# Patient Record
Sex: MALE | Race: WHITE | HISPANIC OR LATINO | Employment: FULL TIME | URBAN - METROPOLITAN AREA
[De-identification: names, ages, dates, MRNs, and addresses within clinical notes are randomized per-mention and may not be internally consistent; named-entity substitution may affect disease eponyms.]

---

## 2020-01-20 ENCOUNTER — HOSPITAL ENCOUNTER (INPATIENT)
Facility: HOSPITAL | Age: 65
LOS: 3 days | Discharge: HOME | DRG: 392 | End: 2020-01-23
Attending: EMERGENCY MEDICINE | Admitting: HOSPITALIST
Payer: COMMERCIAL

## 2020-01-20 DIAGNOSIS — R14.0 ABDOMINAL DISTENTION: ICD-10-CM

## 2020-01-20 DIAGNOSIS — K56.7 ILEUS: ICD-10-CM

## 2020-01-20 DIAGNOSIS — R10.9 ABDOMINAL PAIN: ICD-10-CM

## 2020-01-20 DIAGNOSIS — Z01.89 ENCOUNTER FOR IMAGING STUDY TO CONFIRM NASOGASTRIC (NG) TUBE PLACEMENT: ICD-10-CM

## 2020-01-20 DIAGNOSIS — K56.600 PARTIAL SMALL BOWEL OBSTRUCTION: Primary | ICD-10-CM

## 2020-01-20 DIAGNOSIS — K52.9 ENTERITIS: ICD-10-CM

## 2020-01-20 PROBLEM — E78.5 HYPERLIPIDEMIA: Status: ACTIVE | Noted: 2020-01-20

## 2020-01-20 PROBLEM — D72.829 LEUKOCYTOSIS: Status: ACTIVE | Noted: 2020-01-20

## 2020-01-20 PROBLEM — I10 HYPERTENSION: Status: ACTIVE | Noted: 2020-01-20

## 2020-01-20 LAB
ABO + RH BLD: NORMAL
ALBUMIN SERPL BCP-MCNC: 4.4 G/DL (ref 3.5–5.2)
ALP SERPL-CCNC: 88 U/L (ref 55–135)
ALT SERPL W/O P-5'-P-CCNC: 27 U/L (ref 10–44)
ANION GAP SERPL CALC-SCNC: 11 MMOL/L (ref 8–16)
APTT BLDCRRT: 26.4 SEC (ref 21–32)
AST SERPL-CCNC: 26 U/L (ref 10–40)
BASOPHILS # BLD AUTO: 0.07 K/UL (ref 0–0.2)
BASOPHILS NFR BLD: 0.4 % (ref 0–1.9)
BILIRUB SERPL-MCNC: 0.4 MG/DL (ref 0.1–1)
BLD GP AB SCN CELLS X3 SERPL QL: NORMAL
BUN SERPL-MCNC: 19 MG/DL (ref 8–23)
CALCIUM SERPL-MCNC: 9.6 MG/DL (ref 8.7–10.5)
CHLORIDE SERPL-SCNC: 103 MMOL/L (ref 95–110)
CO2 SERPL-SCNC: 26 MMOL/L (ref 23–29)
CREAT SERPL-MCNC: 1.2 MG/DL (ref 0.5–1.4)
DIFFERENTIAL METHOD: ABNORMAL
EOSINOPHIL # BLD AUTO: 0.1 K/UL (ref 0–0.5)
EOSINOPHIL NFR BLD: 0.3 % (ref 0–8)
ERYTHROCYTE [DISTWIDTH] IN BLOOD BY AUTOMATED COUNT: 12.7 % (ref 11.5–14.5)
EST. GFR  (AFRICAN AMERICAN): >60 ML/MIN/1.73 M^2
EST. GFR  (NON AFRICAN AMERICAN): >60 ML/MIN/1.73 M^2
GLUCOSE SERPL-MCNC: 213 MG/DL (ref 70–110)
HCT VFR BLD AUTO: 46.5 % (ref 40–54)
HGB BLD-MCNC: 16 G/DL (ref 14–18)
IMM GRANULOCYTES # BLD AUTO: 0.12 K/UL (ref 0–0.04)
IMM GRANULOCYTES NFR BLD AUTO: 0.6 % (ref 0–0.5)
INR PPP: 1 (ref 0.8–1.2)
LACTATE SERPL-SCNC: 1.7 MMOL/L (ref 0.5–2.2)
LIPASE SERPL-CCNC: 33 U/L (ref 4–60)
LYMPHOCYTES # BLD AUTO: 1.1 K/UL (ref 1–4.8)
LYMPHOCYTES NFR BLD: 5.8 % (ref 18–48)
MCH RBC QN AUTO: 32.5 PG (ref 27–31)
MCHC RBC AUTO-ENTMCNC: 34.4 G/DL (ref 32–36)
MCV RBC AUTO: 95 FL (ref 82–98)
MONOCYTES # BLD AUTO: 1.5 K/UL (ref 0.3–1)
MONOCYTES NFR BLD: 7.5 % (ref 4–15)
NEUTROPHILS # BLD AUTO: 16.7 K/UL (ref 1.8–7.7)
NEUTROPHILS NFR BLD: 85.4 % (ref 38–73)
NRBC BLD-RTO: 0 /100 WBC
PLATELET # BLD AUTO: 287 K/UL (ref 150–350)
PMV BLD AUTO: 9.1 FL (ref 9.2–12.9)
POCT GLUCOSE: 212 MG/DL (ref 70–110)
POCT GLUCOSE: 246 MG/DL (ref 70–110)
POTASSIUM SERPL-SCNC: 4.1 MMOL/L (ref 3.5–5.1)
PROT SERPL-MCNC: 7.9 G/DL (ref 6–8.4)
PROTHROMBIN TIME: 10.7 SEC (ref 9–12.5)
RBC # BLD AUTO: 4.92 M/UL (ref 4.6–6.2)
SODIUM SERPL-SCNC: 140 MMOL/L (ref 136–145)
TROPONIN I SERPL DL<=0.01 NG/ML-MCNC: <0.006 NG/ML (ref 0–0.03)
WBC # BLD AUTO: 19.56 K/UL (ref 3.9–12.7)

## 2020-01-20 PROCEDURE — 99285 EMERGENCY DEPT VISIT HI MDM: CPT | Mod: 25

## 2020-01-20 PROCEDURE — C9399 UNCLASSIFIED DRUGS OR BIOLOG: HCPCS | Performed by: HOSPITALIST

## 2020-01-20 PROCEDURE — C9113 INJ PANTOPRAZOLE SODIUM, VIA: HCPCS | Performed by: HOSPITALIST

## 2020-01-20 PROCEDURE — 84484 ASSAY OF TROPONIN QUANT: CPT

## 2020-01-20 PROCEDURE — 83605 ASSAY OF LACTIC ACID: CPT

## 2020-01-20 PROCEDURE — 80053 COMPREHEN METABOLIC PANEL: CPT

## 2020-01-20 PROCEDURE — 87040 BLOOD CULTURE FOR BACTERIA: CPT

## 2020-01-20 PROCEDURE — 85610 PROTHROMBIN TIME: CPT

## 2020-01-20 PROCEDURE — 93010 EKG 12-LEAD: ICD-10-PCS | Mod: ,,, | Performed by: INTERNAL MEDICINE

## 2020-01-20 PROCEDURE — 85025 COMPLETE CBC W/AUTO DIFF WBC: CPT

## 2020-01-20 PROCEDURE — 63600175 PHARM REV CODE 636 W HCPCS: Performed by: EMERGENCY MEDICINE

## 2020-01-20 PROCEDURE — 93010 ELECTROCARDIOGRAM REPORT: CPT | Mod: ,,, | Performed by: INTERNAL MEDICINE

## 2020-01-20 PROCEDURE — 96374 THER/PROPH/DIAG INJ IV PUSH: CPT

## 2020-01-20 PROCEDURE — 86850 RBC ANTIBODY SCREEN: CPT

## 2020-01-20 PROCEDURE — 85730 THROMBOPLASTIN TIME PARTIAL: CPT

## 2020-01-20 PROCEDURE — 11000001 HC ACUTE MED/SURG PRIVATE ROOM

## 2020-01-20 PROCEDURE — 83690 ASSAY OF LIPASE: CPT

## 2020-01-20 PROCEDURE — 63600175 PHARM REV CODE 636 W HCPCS: Performed by: HOSPITALIST

## 2020-01-20 PROCEDURE — 93005 ELECTROCARDIOGRAM TRACING: CPT

## 2020-01-20 RX ORDER — GLUCAGON 1 MG
1 KIT INJECTION
Status: DISCONTINUED | OUTPATIENT
Start: 2020-01-20 | End: 2020-01-23 | Stop reason: HOSPADM

## 2020-01-20 RX ORDER — GLIPIZIDE 5 MG/1
TABLET ORAL
COMMUNITY
Start: 2020-01-15

## 2020-01-20 RX ORDER — MORPHINE SULFATE 10 MG/ML
4 INJECTION INTRAMUSCULAR; INTRAVENOUS; SUBCUTANEOUS EVERY 4 HOURS PRN
Status: DISCONTINUED | OUTPATIENT
Start: 2020-01-20 | End: 2020-01-23 | Stop reason: HOSPADM

## 2020-01-20 RX ORDER — SODIUM CHLORIDE, SODIUM LACTATE, POTASSIUM CHLORIDE, CALCIUM CHLORIDE 600; 310; 30; 20 MG/100ML; MG/100ML; MG/100ML; MG/100ML
INJECTION, SOLUTION INTRAVENOUS CONTINUOUS
Status: DISCONTINUED | OUTPATIENT
Start: 2020-01-20 | End: 2020-01-23 | Stop reason: HOSPADM

## 2020-01-20 RX ORDER — BUPROPION HYDROCHLORIDE 150 MG/1
TABLET, EXTENDED RELEASE ORAL
Status: ON HOLD | COMMUNITY
Start: 2020-01-15 | End: 2020-01-23 | Stop reason: HOSPADM

## 2020-01-20 RX ORDER — IBUPROFEN 200 MG
24 TABLET ORAL
Status: DISCONTINUED | OUTPATIENT
Start: 2020-01-20 | End: 2020-01-23 | Stop reason: HOSPADM

## 2020-01-20 RX ORDER — ATORVASTATIN CALCIUM 40 MG/1
TABLET, FILM COATED ORAL
COMMUNITY
Start: 2019-11-01

## 2020-01-20 RX ORDER — LABETALOL HYDROCHLORIDE 5 MG/ML
10 INJECTION, SOLUTION INTRAVENOUS EVERY 6 HOURS PRN
Status: DISCONTINUED | OUTPATIENT
Start: 2020-01-20 | End: 2020-01-23 | Stop reason: HOSPADM

## 2020-01-20 RX ORDER — SODIUM CHLORIDE 0.9 % (FLUSH) 0.9 %
10 SYRINGE (ML) INJECTION
Status: DISCONTINUED | OUTPATIENT
Start: 2020-01-20 | End: 2020-01-23 | Stop reason: HOSPADM

## 2020-01-20 RX ORDER — IBUPROFEN 200 MG
16 TABLET ORAL
Status: DISCONTINUED | OUTPATIENT
Start: 2020-01-20 | End: 2020-01-23 | Stop reason: HOSPADM

## 2020-01-20 RX ORDER — HYDROMORPHONE HYDROCHLORIDE 2 MG/ML
0.5 INJECTION, SOLUTION INTRAMUSCULAR; INTRAVENOUS; SUBCUTANEOUS
Status: COMPLETED | OUTPATIENT
Start: 2020-01-20 | End: 2020-01-20

## 2020-01-20 RX ORDER — OMEPRAZOLE 40 MG/1
CAPSULE, DELAYED RELEASE ORAL
COMMUNITY
Start: 2019-10-31

## 2020-01-20 RX ORDER — PANTOPRAZOLE SODIUM 40 MG/10ML
40 INJECTION, POWDER, LYOPHILIZED, FOR SOLUTION INTRAVENOUS DAILY
Status: DISCONTINUED | OUTPATIENT
Start: 2020-01-20 | End: 2020-01-23 | Stop reason: HOSPADM

## 2020-01-20 RX ORDER — ONDANSETRON 2 MG/ML
4 INJECTION INTRAMUSCULAR; INTRAVENOUS EVERY 6 HOURS PRN
Status: DISCONTINUED | OUTPATIENT
Start: 2020-01-20 | End: 2020-01-23 | Stop reason: HOSPADM

## 2020-01-20 RX ORDER — DULAGLUTIDE 1.5 MG/.5ML
INJECTION, SOLUTION SUBCUTANEOUS
COMMUNITY
Start: 2020-01-15

## 2020-01-20 RX ORDER — BUPROPION HYDROCHLORIDE 150 MG/1
150 TABLET, EXTENDED RELEASE ORAL
COMMUNITY
Start: 2019-11-01 | End: 2020-10-31

## 2020-01-20 RX ORDER — MORPHINE SULFATE 10 MG/ML
2 INJECTION INTRAMUSCULAR; INTRAVENOUS; SUBCUTANEOUS EVERY 4 HOURS PRN
Status: DISCONTINUED | OUTPATIENT
Start: 2020-01-20 | End: 2020-01-23 | Stop reason: HOSPADM

## 2020-01-20 RX ORDER — INSULIN ASPART 100 [IU]/ML
1-10 INJECTION, SOLUTION INTRAVENOUS; SUBCUTANEOUS
Status: DISCONTINUED | OUTPATIENT
Start: 2020-01-20 | End: 2020-01-23 | Stop reason: HOSPADM

## 2020-01-20 RX ADMIN — PANTOPRAZOLE SODIUM 40 MG: 40 INJECTION, POWDER, FOR SOLUTION INTRAVENOUS at 06:01

## 2020-01-20 RX ADMIN — INSULIN ASPART 2 UNITS: 100 INJECTION, SOLUTION INTRAVENOUS; SUBCUTANEOUS at 09:01

## 2020-01-20 RX ADMIN — SODIUM CHLORIDE 1000 ML: 0.9 INJECTION, SOLUTION INTRAVENOUS at 03:01

## 2020-01-20 RX ADMIN — INSULIN DETEMIR 10 UNITS: 100 INJECTION, SOLUTION SUBCUTANEOUS at 09:01

## 2020-01-20 RX ADMIN — HYDROMORPHONE HYDROCHLORIDE 0.5 MG: 2 INJECTION, SOLUTION INTRAMUSCULAR; INTRAVENOUS; SUBCUTANEOUS at 03:01

## 2020-01-20 RX ADMIN — MORPHINE SULFATE 4 MG: 10 INJECTION INTRAVENOUS at 08:01

## 2020-01-20 RX ADMIN — SODIUM CHLORIDE, SODIUM LACTATE, POTASSIUM CHLORIDE, AND CALCIUM CHLORIDE: .6; .31; .03; .02 INJECTION, SOLUTION INTRAVENOUS at 06:01

## 2020-01-20 NOTE — ASSESSMENT & PLAN NOTE
Likely leukomoid reaction secondary to above  No source of infection and no fevers  No indication for antibiotics at this time  Will continue to monitor

## 2020-01-20 NOTE — ASSESSMENT & PLAN NOTE
Unable to resume PO regimen due to NPO status  Will use PRN labetolol   Will resume PO regimen when able

## 2020-01-20 NOTE — SUBJECTIVE & OBJECTIVE
Past Medical History:   Diagnosis Date    Diabetes mellitus     Hyperlipidemia     Hypertension     SBO (small bowel obstruction)     Partial obstruction treated conservatively x 4       History reviewed. No pertinent surgical history.    Review of patient's allergies indicates:  No Known Allergies    No current facility-administered medications on file prior to encounter.      Current Outpatient Medications on File Prior to Encounter   Medication Sig    buPROPion (WELLBUTRIN SR) 150 MG TBSR 12 hr tablet Take 150 mg by mouth.    amlodipine-benazepril 10-20mg (LOTREL) 10-20 mg per capsule Take 1 capsule by mouth once daily.      aspirin 81 MG Chew Take 81 mg by mouth once daily.      atorvastatin (LIPITOR) 40 MG tablet     buPROPion (WELLBUTRIN SR) 150 MG TBSR 12 hr tablet     duloxetine (CYMBALTA) 60 MG capsule Take 60 mg by mouth once daily.      ezetimibe-simvastatin 10-40 mg (VYTORIN) 10-40 mg per tablet Take 1 tablet by mouth every evening.      glipiZIDE (GLUCOTROL) 5 MG tablet     metformin (GLUCOPHAGE) 850 MG tablet Take 850 mg by mouth 2 (two) times daily with meals.      omeprazole (PRILOSEC) 40 MG capsule     TRULICITY 1.5 mg/0.5 mL PnIj      Family History     Problem Relation (Age of Onset)    COPD Father        Tobacco Use    Smoking status: Former Smoker     Packs/day: 1.50     Years: 15.00     Pack years: 22.50    Smokeless tobacco: Never Used    Tobacco comment: Quit smoking in 1992   Substance and Sexual Activity    Alcohol use: No    Drug use: Not on file    Sexual activity: Not on file     Review of Systems   Constitutional: Negative for chills and fever.   HENT: Negative for sore throat and trouble swallowing.    Eyes: Negative for visual disturbance.   Respiratory: Negative for shortness of breath.    Cardiovascular: Negative for chest pain.   Gastrointestinal: Positive for abdominal distention, abdominal pain and constipation. Negative for blood in stool, diarrhea, nausea  and vomiting.   Endocrine: Negative for polyuria.   Genitourinary: Negative for dysuria.   Musculoskeletal: Negative for back pain.   Skin: Negative for rash.   Neurological: Negative for light-headedness.   Hematological: Does not bruise/bleed easily.   Psychiatric/Behavioral: Negative for confusion.     Objective:     Vital Signs (Most Recent):  Temp: 97.6 °F (36.4 °C) (01/20/20 1708)  Pulse: 90 (01/20/20 1708)  Resp: 18 (01/20/20 1708)  BP: (!) 179/92 (01/20/20 1708)  SpO2: 97 % (01/20/20 1708) Vital Signs (24h Range):  Temp:  [97.6 °F (36.4 °C)-98.4 °F (36.9 °C)] 97.6 °F (36.4 °C)  Pulse:  [] 90  Resp:  [18-20] 18  SpO2:  [91 %-97 %] 97 %  BP: (148-181)/() 179/92     Weight: 106.6 kg (235 lb)  Body mass index is 34.7 kg/m².    Physical Exam   Constitutional: He appears well-developed. No distress.   HENT:   Head: Normocephalic and atraumatic.   NG tube in place with small amount of blood around nares   Eyes: Conjunctivae and EOM are normal.   Neck: Normal range of motion. Neck supple.   Cardiovascular: Normal rate and regular rhythm.   Pulmonary/Chest: Effort normal and breath sounds normal.   Abdominal:   Distended, tenderness to palpation diffusely, but concentrated around the umbilicus, no rebound or guarding, hypoactive bowel sounds   Musculoskeletal: Normal range of motion. He exhibits no edema.   Neurological: He is alert.   Skin: Skin is warm and dry. Capillary refill takes less than 2 seconds. He is not diaphoretic.   Psychiatric: He has a normal mood and affect. His behavior is normal. Thought content normal.         CRANIAL NERVES     CN III, IV, VI   Extraocular motions are normal.        Significant Labs: All pertinent labs within the past 24 hours have been reviewed.    Significant Imaging: I have reviewed and interpreted all pertinent imaging results/findings within the past 24 hours.

## 2020-01-20 NOTE — ED TRIAGE NOTES
Patient reports mid-center abdominal pain x12 hours. Hx of bowel obstruction in the past. Denies N/V. Does report constipation x1 week.    Two patient identifiers have been checked and are correct.      Appearance: Pt awake, alert & oriented to person, place & time. Pt in no acute distress at present time. Pt is clean and well groomed with clothes appropriately fastened.   Skin: Skin warm, dry & intact. Color consistent with ethnicity. Mucous membranes moist. No breakdown or brusing noted.   Musculoskeletal: Patient moving all extremities well, no obvious swelling or deformities noted.   Respiratory: Respirations spontaneous, even, and non-labored. Visible chest rise noted. Airway is open and patent. No accessory muscle use noted.   Neurologic: Sensation is intact. Speech is clear and appropriate. Eyes open spontaneously, behavior appropriate to situation, follows commands, facial expression symmetrical, bilateral hand grasp equal and even, purposeful motor response noted.  Cardiac: All peripheral pulses present. No Bilateral lower extremity edema. Cap refill is <3 seconds.  Abdomen: Abdomen soft, tender to palpation.   : Pt reports no dysuria or hematuria.

## 2020-01-20 NOTE — ASSESSMENT & PLAN NOTE
Hold outpatient regimen and unclear control in the past  Will start low dose basal insulin, accuchecks Q6 hours while NPO and have SSI   Will check HgbA1C  Glucose goal of 140-180 during hospitalization

## 2020-01-20 NOTE — HPI
"64-year-old male with HTN, HLP, DM2 and with history of partial SBO x4 in the past treated conservatively who presented with abdominal pain and distension that started last night around 8:00 p.m. He has been having constipation for the past week. His last normal bowel movement was 1 week ago, but he reported passing some flatus and small, hard stool this morning.  Pain did not improve with this small passing of stool, and it only increased.  Abdominal pain is diffuse but mainly located in the mid abdomen.  No aggravating or relieving factors.  No reported nausea or vomiting.  No shortness of breath or chest pain, no fever or chills.  In the ER, workup with CT scan of the abdomen pelvis without contrast showed "The distal esophagus is somewhat patulous with layering fluid.  Gastric lumen is fluid distended with high density material at the pylorus.  The proximal and mid small bowel including the duodenum and jejunum are dilated and fluid-filled common definitive transition point is not identified, however the ileum appears collapsed.  Stool is seen throughout the colon."  WBC elevated 19.56, afebrile temperature of 97.6° F.  "

## 2020-01-20 NOTE — ED PROVIDER NOTES
Encounter Date: 1/20/2020       History     Chief Complaint   Patient presents with    Abdominal Pain     EMS reports pt c/o abdominal pain starting last night reports has not had a good BM in over one week.      64 y.o. male Past Medical History:  No date: Diabetes mellitus  No date: Hyperlipidemia  No date: Hypertension     Prior SBO, presents c/o 1 day abd cramping/distention, notes small hard bm this am . No f/c, n/v, diarrhea/dysuria. No vomiting/diarrhea.        Review of patient's allergies indicates:  No Known Allergies  Past Medical History:   Diagnosis Date    Diabetes mellitus     Hyperlipidemia     Hypertension      No past surgical history on file.  No family history on file.  Social History     Tobacco Use    Smoking status: Never Smoker   Substance Use Topics    Alcohol use: No    Drug use: Not on file     Review of Systems   Constitutional: Negative for fever.   HENT: Negative for sore throat.    Respiratory: Negative for shortness of breath.    Cardiovascular: Negative for chest pain.   Gastrointestinal: Positive for abdominal distention and abdominal pain. Negative for nausea.   Genitourinary: Negative for dysuria.   Musculoskeletal: Negative for back pain.   Skin: Negative for rash.   Neurological: Negative for weakness.   Hematological: Does not bruise/bleed easily.   All other systems reviewed and are negative.      Physical Exam     Initial Vitals [01/20/20 1431]   BP Pulse Resp Temp SpO2   (!) 155/85 100 18 98.4 °F (36.9 °C) 97 %      MAP       --         Physical Exam    Nursing note and vitals reviewed.  Constitutional: He appears well-developed and well-nourished.   HENT:   Head: Normocephalic and atraumatic.   Eyes: EOM are normal. Pupils are equal, round, and reactive to light.   Cardiovascular: Normal rate and regular rhythm.   Pulmonary/Chest: Effort normal.   Abdominal: He exhibits distension.   Musculoskeletal: He exhibits no edema or tenderness.   Neurological: He is alert and  oriented to person, place, and time. No cranial nerve deficit.   Skin: Skin is warm and dry.   Psychiatric: He has a normal mood and affect.     non focal mild abd ttp throughout  Rectal exam no fecal impaction    ED Course   Procedures  Labs Reviewed - No data to display  EKG Readings: (Independently Interpreted)   Hr 91, L axis, sinus, no mark/twi, non acute, no stemi.        Imaging Results    None                             screening labs, ct abd/pelvis.        Labs Reviewed   CBC W/ AUTO DIFFERENTIAL - Abnormal; Notable for the following components:       Result Value    WBC 19.56 (*)     Mean Corpuscular Hemoglobin 32.5 (*)     MPV 9.1 (*)     Immature Granulocytes 0.6 (*)     Gran # (ANC) 16.7 (*)     Immature Grans (Abs) 0.12 (*)     Mono # 1.5 (*)     Gran% 85.4 (*)     Lymph% 5.8 (*)     All other components within normal limits   COMPREHENSIVE METABOLIC PANEL - Abnormal; Notable for the following components:    Glucose 213 (*)     All other components within normal limits   CULTURE, BLOOD   CULTURE, BLOOD   LIPASE   TROPONIN I   PROTIME-INR   APTT   LACTIC ACID, PLASMA   TYPE & SCREEN       CT Abdomen Pelvis  Without Contrast   Final Result      Limited evaluation without IV or PO contrast.  Gastric and small bowel fluid-filled distention as described above, no definitive transition point noted.  Findings may reflect ileus versus partial obstruction.         Electronically signed by: Efraín Dinero   Date:    01/20/2020   Time:    15:42        Wbc count elevation noted. Pt is not septic and does not meet sirs criteria at this time.    I have ordered NG tube, will admit pt to medicine team and consult surgery.    I have spoken with Dr. Romeo from gen surg who is aware of the patient and recommends soap suds enema.    Clinical Impression:       ICD-10-CM ICD-9-CM   1. Partial small bowel obstruction K56.600 560.9   2. Abdominal pain R10.9 789.00   3. Ileus K56.7 560.1                              Mihaela Wall MD  01/20/20 2908

## 2020-01-20 NOTE — ASSESSMENT & PLAN NOTE
Patient with 4 episodes of partial SBO in that past, last hospitalization about 2 years ago treated in [place]  CT scan with partial SBO vs ileus  S/p NG for decompression placed in the ER  Will treat with bowel rest, IV fluids, supportive care and pain control  Will consult General surgery for their input, but hopefully will be able to manage conservatively like in the past

## 2020-01-21 LAB
ALBUMIN SERPL BCP-MCNC: 4.3 G/DL (ref 3.5–5.2)
ALP SERPL-CCNC: 99 U/L (ref 55–135)
ALT SERPL W/O P-5'-P-CCNC: 30 U/L (ref 10–44)
ANION GAP SERPL CALC-SCNC: 13 MMOL/L (ref 8–16)
AST SERPL-CCNC: 20 U/L (ref 10–40)
BASOPHILS # BLD AUTO: 0.05 K/UL (ref 0–0.2)
BASOPHILS NFR BLD: 0.4 % (ref 0–1.9)
BILIRUB SERPL-MCNC: 0.6 MG/DL (ref 0.1–1)
BUN SERPL-MCNC: 22 MG/DL (ref 8–23)
CALCIUM SERPL-MCNC: 9.7 MG/DL (ref 8.7–10.5)
CHLORIDE SERPL-SCNC: 102 MMOL/L (ref 95–110)
CO2 SERPL-SCNC: 25 MMOL/L (ref 23–29)
CREAT SERPL-MCNC: 0.9 MG/DL (ref 0.5–1.4)
DIFFERENTIAL METHOD: ABNORMAL
EOSINOPHIL # BLD AUTO: 0 K/UL (ref 0–0.5)
EOSINOPHIL NFR BLD: 0.1 % (ref 0–8)
ERYTHROCYTE [DISTWIDTH] IN BLOOD BY AUTOMATED COUNT: 12.5 % (ref 11.5–14.5)
EST. GFR  (AFRICAN AMERICAN): >60 ML/MIN/1.73 M^2
EST. GFR  (NON AFRICAN AMERICAN): >60 ML/MIN/1.73 M^2
ESTIMATED AVG GLUCOSE: 117 MG/DL (ref 68–131)
GLUCOSE SERPL-MCNC: 212 MG/DL (ref 70–110)
HBA1C MFR BLD HPLC: 5.7 % (ref 4–5.6)
HCT VFR BLD AUTO: 51 % (ref 40–54)
HGB BLD-MCNC: 17 G/DL (ref 14–18)
IMM GRANULOCYTES # BLD AUTO: 0.04 K/UL (ref 0–0.04)
IMM GRANULOCYTES NFR BLD AUTO: 0.3 % (ref 0–0.5)
LYMPHOCYTES # BLD AUTO: 0.7 K/UL (ref 1–4.8)
LYMPHOCYTES NFR BLD: 5.2 % (ref 18–48)
MAGNESIUM SERPL-MCNC: 1.9 MG/DL (ref 1.6–2.6)
MCH RBC QN AUTO: 31.1 PG (ref 27–31)
MCHC RBC AUTO-ENTMCNC: 33.3 G/DL (ref 32–36)
MCV RBC AUTO: 93 FL (ref 82–98)
MONOCYTES # BLD AUTO: 1.6 K/UL (ref 0.3–1)
MONOCYTES NFR BLD: 11.8 % (ref 4–15)
NEUTROPHILS # BLD AUTO: 11.1 K/UL (ref 1.8–7.7)
NEUTROPHILS NFR BLD: 82.2 % (ref 38–73)
NRBC BLD-RTO: 0 /100 WBC
PHOSPHATE SERPL-MCNC: 4.2 MG/DL (ref 2.7–4.5)
PLATELET # BLD AUTO: 303 K/UL (ref 150–350)
PMV BLD AUTO: 9.7 FL (ref 9.2–12.9)
POCT GLUCOSE: 145 MG/DL (ref 70–110)
POCT GLUCOSE: 165 MG/DL (ref 70–110)
POCT GLUCOSE: 171 MG/DL (ref 70–110)
POCT GLUCOSE: 175 MG/DL (ref 70–110)
POTASSIUM SERPL-SCNC: 4.1 MMOL/L (ref 3.5–5.1)
PROT SERPL-MCNC: 7.9 G/DL (ref 6–8.4)
RBC # BLD AUTO: 5.47 M/UL (ref 4.6–6.2)
SODIUM SERPL-SCNC: 140 MMOL/L (ref 136–145)
WBC # BLD AUTO: 13.49 K/UL (ref 3.9–12.7)

## 2020-01-21 PROCEDURE — 80053 COMPREHEN METABOLIC PANEL: CPT

## 2020-01-21 PROCEDURE — 36415 COLL VENOUS BLD VENIPUNCTURE: CPT

## 2020-01-21 PROCEDURE — 85025 COMPLETE CBC W/AUTO DIFF WBC: CPT

## 2020-01-21 PROCEDURE — 83735 ASSAY OF MAGNESIUM: CPT

## 2020-01-21 PROCEDURE — 63600175 PHARM REV CODE 636 W HCPCS: Performed by: HOSPITALIST

## 2020-01-21 PROCEDURE — 11000001 HC ACUTE MED/SURG PRIVATE ROOM

## 2020-01-21 PROCEDURE — 84100 ASSAY OF PHOSPHORUS: CPT

## 2020-01-21 PROCEDURE — 25500020 PHARM REV CODE 255: Performed by: INTERNAL MEDICINE

## 2020-01-21 PROCEDURE — 83036 HEMOGLOBIN GLYCOSYLATED A1C: CPT

## 2020-01-21 PROCEDURE — C9113 INJ PANTOPRAZOLE SODIUM, VIA: HCPCS | Performed by: HOSPITALIST

## 2020-01-21 RX ADMIN — MORPHINE SULFATE 4 MG: 10 INJECTION INTRAVENOUS at 12:01

## 2020-01-21 RX ADMIN — IOHEXOL 100 ML: 350 INJECTION, SOLUTION INTRAVENOUS at 02:01

## 2020-01-21 RX ADMIN — SODIUM CHLORIDE, SODIUM LACTATE, POTASSIUM CHLORIDE, AND CALCIUM CHLORIDE: .6; .31; .03; .02 INJECTION, SOLUTION INTRAVENOUS at 06:01

## 2020-01-21 RX ADMIN — MORPHINE SULFATE 4 MG: 10 INJECTION INTRAVENOUS at 08:01

## 2020-01-21 RX ADMIN — PANTOPRAZOLE SODIUM 40 MG: 40 INJECTION, POWDER, FOR SOLUTION INTRAVENOUS at 08:01

## 2020-01-21 RX ADMIN — SODIUM CHLORIDE, SODIUM LACTATE, POTASSIUM CHLORIDE, AND CALCIUM CHLORIDE: .6; .31; .03; .02 INJECTION, SOLUTION INTRAVENOUS at 04:01

## 2020-01-21 RX ADMIN — MORPHINE SULFATE 4 MG: 10 INJECTION INTRAVENOUS at 04:01

## 2020-01-21 RX ADMIN — INSULIN DETEMIR 10 UNITS: 100 INJECTION, SOLUTION SUBCUTANEOUS at 09:01

## 2020-01-21 RX ADMIN — IOHEXOL 15 ML: 300 INJECTION, SOLUTION INTRAVENOUS at 02:01

## 2020-01-21 NOTE — NURSING
Soap suds enema was effective.  Patient had a large soft/liquid brown stool.  Patient reports that his abdominal pain has decreased.      NGT advanced 22 cm as per X-ray recommendation.  Awaiting KUB results before hooking patient up to wall suction.

## 2020-01-21 NOTE — PLAN OF CARE
Problem: Fall Injury Risk  Goal: Absence of Fall and Fall-Related Injury  Outcome: Ongoing, Progressing     Problem: Adult Inpatient Plan of Care  Goal: Plan of Care Review  Outcome: Ongoing, Progressing     Problem: Adult Inpatient Plan of Care  Goal: Patient-Specific Goal (Individualization)  Outcome: Ongoing, Progressing     Problem: Adult Inpatient Plan of Care  Goal: Absence of Hospital-Acquired Illness or Injury  Outcome: Ongoing, Progressing     Problem: Adult Inpatient Plan of Care  Goal: Optimal Comfort and Wellbeing  Outcome: Ongoing, Progressing     Problem: Adult Inpatient Plan of Care  Goal: Readiness for Transition of Care  Outcome: Ongoing, Progressing

## 2020-01-21 NOTE — H&P
"[facility] Hospital Medicine  History & Physical    Patient Name: Berto Hawthorne  MRN: [id number]  Admission Date: 1/20/2020  Attending Physician: Jo Grey MD   Primary Care Provider: Provider [employer]         Patient information was obtained from patient and ER records.     Subjective:     Principal Problem:Partial small bowel obstruction    Chief Complaint:   Chief Complaint   Patient presents with    Abdominal Pain     EMS reports pt c/o abdominal pain starting last night reports has not had a good BM in over one week.         HPI: 64-year-old male with HTN, HLP, DM2 and with history of partial SBO x4 in the past treated conservatively who presented with abdominal pain and distension that started last night around 8:00 p.m. He has been having constipation for the past week. His last normal bowel movement was 1 week ago, but he reported passing some flatus and small, hard stool this morning.  Pain did not improve with this small passing of stool, and it only increased.  Abdominal pain is diffuse but mainly located in the mid abdomen.  No aggravating or relieving factors.  No reported nausea or vomiting.  No shortness of breath or chest pain, no fever or chills.  In the ER, workup with CT scan of the abdomen pelvis without contrast showed "The distal esophagus is somewhat patulous with layering fluid.  Gastric lumen is fluid distended with high density material at the pylorus.  The proximal and mid small bowel including the duodenum and jejunum are dilated and fluid-filled common definitive transition point is not identified, however the ileum appears collapsed.  Stool is seen throughout the colon."  WBC elevated 19.56, afebrile temperature of 97.6° F.    Past Medical History:   Diagnosis Date    Diabetes mellitus     Hyperlipidemia     Hypertension     SBO (small bowel obstruction)     Partial obstruction treated conservatively x 4       History reviewed. No pertinent surgical " history.    Review of patient's allergies indicates:  No Known Allergies    No current facility-administered medications on file prior to encounter.      Current Outpatient Medications on File Prior to Encounter   Medication Sig    buPROPion (WELLBUTRIN SR) 150 MG TBSR 12 hr tablet Take 150 mg by mouth.    amlodipine-benazepril 10-20mg (LOTREL) 10-20 mg per capsule Take 1 capsule by mouth once daily.      aspirin 81 MG Chew Take 81 mg by mouth once daily.      atorvastatin (LIPITOR) 40 MG tablet     buPROPion (WELLBUTRIN SR) 150 MG TBSR 12 hr tablet     duloxetine (CYMBALTA) 60 MG capsule Take 60 mg by mouth once daily.      ezetimibe-simvastatin 10-40 mg (VYTORIN) 10-40 mg per tablet Take 1 tablet by mouth every evening.      glipiZIDE (GLUCOTROL) 5 MG tablet     metformin (GLUCOPHAGE) 850 MG tablet Take 850 mg by mouth 2 (two) times daily with meals.      omeprazole (PRILOSEC) 40 MG capsule     TRULICITY 1.5 mg/0.5 mL PnIj      Family History     Problem Relation (Age of Onset)    COPD Father        Tobacco Use    Smoking status: Former Smoker     Packs/day: 1.50     Years: 15.00     Pack years: 22.50    Smokeless tobacco: Never Used    Tobacco comment: Quit smoking in 1992   Substance and Sexual Activity    Alcohol use: No    Drug use: Not on file    Sexual activity: Not on file     Review of Systems   Constitutional: Negative for chills and fever.   HENT: Negative for sore throat and trouble swallowing.    Eyes: Negative for visual disturbance.   Respiratory: Negative for shortness of breath.    Cardiovascular: Negative for chest pain.   Gastrointestinal: Positive for abdominal distention, abdominal pain and constipation. Negative for blood in stool, diarrhea, nausea and vomiting.   Endocrine: Negative for polyuria.   Genitourinary: Negative for dysuria.   Musculoskeletal: Negative for back pain.   Skin: Negative for rash.   Neurological: Negative for light-headedness.   Hematological: Does not  bruise/bleed easily.   Psychiatric/Behavioral: Negative for confusion.     Objective:     Vital Signs (Most Recent):  Temp: 97.6 °F (36.4 °C) (01/20/20 1708)  Pulse: 90 (01/20/20 1708)  Resp: 18 (01/20/20 1708)  BP: (!) 179/92 (01/20/20 1708)  SpO2: 97 % (01/20/20 1708) Vital Signs (24h Range):  Temp:  [97.6 °F (36.4 °C)-98.4 °F (36.9 °C)] 97.6 °F (36.4 °C)  Pulse:  [] 90  Resp:  [18-20] 18  SpO2:  [91 %-97 %] 97 %  BP: (148-181)/() 179/92     Weight: 106.6 kg (235 lb)  Body mass index is 34.7 kg/m².    Physical Exam   Constitutional: He appears well-developed. No distress.   HENT:   Head: Normocephalic and atraumatic.   NG tube in place with small amount of blood around nares   Eyes: Conjunctivae and EOM are normal.   Neck: Normal range of motion. Neck supple.   Cardiovascular: Normal rate and regular rhythm.   Pulmonary/Chest: Effort normal and breath sounds normal.   Abdominal:   Distended, tenderness to palpation diffusely, but concentrated around the umbilicus, no rebound or guarding, hypoactive bowel sounds   Musculoskeletal: Normal range of motion. He exhibits no edema.   Neurological: He is alert.   Skin: Skin is warm and dry. Capillary refill takes less than 2 seconds. He is not diaphoretic.   Psychiatric: He has a normal mood and affect. His behavior is normal. Thought content normal.         CRANIAL NERVES     CN III, IV, VI   Extraocular motions are normal.        Significant Labs: All pertinent labs within the past 24 hours have been reviewed.    Significant Imaging: I have reviewed and interpreted all pertinent imaging results/findings within the past 24 hours.    Assessment/Plan:     * Partial small bowel obstruction  Patient with 4 episodes of partial SBO in that past, last hospitalization about 2 years ago treated in [place]  CT scan with partial SBO vs ileus  S/p NG for decompression placed in the ER  Will treat with bowel rest, IV fluids, supportive care and pain control  Will consult  General surgery for their input, but hopefully will be able to manage conservatively like in the past    Leukocytosis  Likely leukomoid reaction secondary to above  No source of infection and no fevers  No indication for antibiotics at this time  Will continue to monitor    Hypertension  Unable to resume PO regimen due to NPO status  Will use PRN labetolol   Will resume PO regimen when able    Hyperlipidemia  Usually on atorvastatin  Hold statin for now given NPO status    Diabetes mellitus, type 2  Hold outpatient regimen and unclear control in the past  Will start low dose basal insulin, accuchecks Q6 hours while NPO and have SSI   Will check HgbA1C  Glucose goal of 140-180 during hospitalization      VTE Risk Mitigation (From admission, onward)         Ordered     Place ERVIN hose  Until discontinued      01/20/20 1804     IP VTE HIGH RISK PATIENT  Once      01/20/20 1740     Place sequential compression device  Until discontinued      01/20/20 1740                   Jo Grey MD  Department of Hospital Medicine   [facility]

## 2020-01-21 NOTE — CONSULTS
General Surgery Consult Note    Reason for consult: SBO  Consulting service: Primary    HPI  Berto Hawthorne is a 64 y.o. male  w/ PMHx of HTN, and DM2 and with history of partial SBO x4 in the past treated conservatively who presented with abdominal pain and distension that started last night around 8:00 p.m. He has been having constipation for the past week. His last normal bowel movement was 1 week ago, but he reported passing some flatus and small, hard stool this morning. Of note he reports mostly abdominal pain and denies n/v.    CT scan showing SBO vs ileus (no definitive transition point identified) and large stool burden in R colon and transverse colon.    WBC 19.5, lactic 1.7    Comprehensive ROS negative except as stated in HPI.    PMH  Past Medical History:   Diagnosis Date    Diabetes mellitus     Hyperlipidemia     Hypertension     SBO (small bowel obstruction)     Partial obstruction treated conservatively x 4       PSH  No previous abdominal surgeries    SocHx  30PY, quit almost 30 years ago/occ alcohol/denies illicits    FamHx  Family History   Problem Relation Age of Onset    COPD Father        All  Review of patient's allergies indicates:  No Known Allergies    Medications  See MAR    Physical Exam  General: AAOx3, NAD  HEENT: NC, AT, EOMI, NG tube in place  Neck: supple, no TTP  Chest: symmetric expansion, no distress  Cardiovascular: RRR, normal cap refill  Abdomen: soft, NTND  Extremities: moves all, no edema    Assessment: 64 y.o. male here w/ concern for SBO vs ileus. From the history and imaging, I believe that this is mostly severe constipation rather than SBO. Agree w/ NG tube given significant dilation of the stomach. Received enema in the ED w/ large BMs and improvement of his pain.    Plan:  -Cont NG tube to LIWS  -NPO  -Might require repeat enema if no bowel function in a few days  -Will cont to follow    Everett Romeo, HO-III  Surgery

## 2020-01-21 NOTE — PLAN OF CARE
Problem: Fall Injury Risk  Goal: Absence of Fall and Fall-Related Injury  1/21/2020 0322 by Clara Savage RN  Outcome: Ongoing, Progressing  1/21/2020 0317 by Clara Savage RN  Outcome: Ongoing, Progressing     Problem: Adult Inpatient Plan of Care  Goal: Plan of Care Review  1/21/2020 0322 by Clara Savage RN  Outcome: Ongoing, Progressing  1/21/2020 0317 by Clara Savage RN  Outcome: Ongoing, Progressing     Problem: Adult Inpatient Plan of Care  Goal: Patient-Specific Goal (Individualization)  1/21/2020 0322 by Clara Savage RN  Outcome: Ongoing, Progressing  1/21/2020 0317 by Clara Savage RN  Outcome: Ongoing, Progressing     Problem: Adult Inpatient Plan of Care  Goal: Absence of Hospital-Acquired Illness or Injury  1/21/2020 0322 by Clara Savage RN  Outcome: Ongoing, Progressing  1/21/2020 0317 by Clara Savage RN  Outcome: Ongoing, Progressing     Problem: Adult Inpatient Plan of Care  Goal: Optimal Comfort and Wellbeing  1/21/2020 0322 by Clara Savage RN  Outcome: Ongoing, Progressing  1/21/2020 0317 by Clara Savage RN  Outcome: Ongoing, Progressing     Problem: Adult Inpatient Plan of Care  Goal: Readiness for Transition of Care  1/21/2020 0322 by Clara Savage RN  Outcome: Ongoing, Progressing  1/21/2020 0317 by Clara Savage RN  Outcome: Ongoing, Progressing     Problem: Adult Inpatient Plan of Care  Goal: Rounds/Family Conference  1/21/2020 0322 by Clara Savage RN  Outcome: Ongoing, Progressing  1/21/2020 0317 by Clara Savage RN  Outcome: Ongoing, Progressing     Problem: Diabetes Comorbidity  Goal: Blood Glucose Level Within Desired Range  1/21/2020 0322 by Clara Savage RN  Outcome: Ongoing, Progressing  1/21/2020 0317 by Clara Savage RN  Outcome: Ongoing, Progressing     Problem: Wound  Goal: Optimal Wound Healing  Outcome: Ongoing, Progressing

## 2020-01-21 NOTE — NURSING
Patient arrived to floor via wheelchair with transporter from ED.  Patient transferred to bed independently.  AAOx4.  Patient was oriented to room, information on whiteboard, and medication regimen.  Bed low, adequate lighting provided, side rails x2 up, call bell within reach.  Admission assessment completed.  VSS.  Patient c/o abdominal pain.  Will continue to monitor and follow treatment plan.

## 2020-01-21 NOTE — PLAN OF CARE
Pt remained free from falls and injury during, PRN pain medication administered per MD orders, NPO, NG to placed in right nares connected to low continuous suction, SCD,ERVIN, TELE box 8586 maintained, IV fluids maintained, abdominal xray done, CT scan done, safety maintained will continue to monitor.

## 2020-01-21 NOTE — PLAN OF CARE
"To patient's room to discuss patient managing his care at home.      TN Role Explained.  Patient identified by using 2 identifiers:  Name and date of birth    Patient stated that his wife WILL HELP AT HOME WITH his RECOVERY.       TN reviewed with patient contents of "Push IO Packet".      TN name and contact info placed on the communication board along with disposition, approximate date of DC, next steps and emergency contact    Preferred Pharmacy:  [facility] in [place], [place]      Will self schedule PCP appointment when returns home       01/21/20 1319   Discharge Assessment   Assessment Type Discharge Planning Assessment   Confirmed/corrected address and phone number on facesheet? Yes   Assessment information obtained from? Patient   Expected Length of Stay (days) 3   Communicated expected length of stay with patient/caregiver yes   Prior to hospitilization cognitive status: Alert/Oriented   Prior to hospitalization functional status: Independent   Current cognitive status: Alert/Oriented   Current Functional Status: Independent   Lives With spouse   Able to Return to Prior Arrangements yes   Is patient able to care for self after discharge? Yes   Patient's perception of discharge disposition home or selfcare   Readmission Within the Last 30 Days no previous admission in last 30 days   Patient currently being followed by outpatient case management? No   Patient currently receives any other outside agency services? No   Equipment Currently Used at Home none   Do you have any problems affording any of your prescribed medications? No   Is the patient taking medications as prescribed? yes   Does the patient have transportation home? Yes   Transportation Anticipated family or friend will provide   Does the patient receive services at the [facility]? No   Discharge Plan A Home   Discharge Plan B   (tbd)   DME Needed Upon Discharge  none   Patient/Family in Agreement with Plan yes     "

## 2020-01-21 NOTE — PROGRESS NOTES
Patient reports feeling better after NGT.  Denies fevers.  Reports bowel movement with enema yesterday  Abdomen is distended with very mild tenderness.  There is no rebound or guarding.  WBC is down to 13.4     -63yo M with recurrent bowel obstructions reported without previous surgery.  CT was obtained without IV or PO contrast.  It is concerning that WBC was 20 on admission.      - We need to obtain CT with PO and Iv contrast  -Continue NGT, IVF  -We will follow closely    Darryl Whalen MD  [facility] Surgery Resident  C: [phone]

## 2020-01-22 LAB
ALBUMIN SERPL BCP-MCNC: 3.7 G/DL (ref 3.5–5.2)
ALP SERPL-CCNC: 78 U/L (ref 55–135)
ALT SERPL W/O P-5'-P-CCNC: 24 U/L (ref 10–44)
ANION GAP SERPL CALC-SCNC: 9 MMOL/L (ref 8–16)
AST SERPL-CCNC: 16 U/L (ref 10–40)
BASOPHILS # BLD AUTO: 0.06 K/UL (ref 0–0.2)
BASOPHILS NFR BLD: 1 % (ref 0–1.9)
BILIRUB SERPL-MCNC: 0.5 MG/DL (ref 0.1–1)
BUN SERPL-MCNC: 15 MG/DL (ref 8–23)
CALCIUM SERPL-MCNC: 8.8 MG/DL (ref 8.7–10.5)
CHLORIDE SERPL-SCNC: 104 MMOL/L (ref 95–110)
CO2 SERPL-SCNC: 27 MMOL/L (ref 23–29)
CREAT SERPL-MCNC: 0.7 MG/DL (ref 0.5–1.4)
DIFFERENTIAL METHOD: ABNORMAL
EOSINOPHIL # BLD AUTO: 0.1 K/UL (ref 0–0.5)
EOSINOPHIL NFR BLD: 2 % (ref 0–8)
ERYTHROCYTE [DISTWIDTH] IN BLOOD BY AUTOMATED COUNT: 12.3 % (ref 11.5–14.5)
EST. GFR  (AFRICAN AMERICAN): >60 ML/MIN/1.73 M^2
EST. GFR  (NON AFRICAN AMERICAN): >60 ML/MIN/1.73 M^2
GLUCOSE SERPL-MCNC: 138 MG/DL (ref 70–110)
HCT VFR BLD AUTO: 45.6 % (ref 40–54)
HGB BLD-MCNC: 15.2 G/DL (ref 14–18)
IMM GRANULOCYTES # BLD AUTO: 0.01 K/UL (ref 0–0.04)
IMM GRANULOCYTES NFR BLD AUTO: 0.2 % (ref 0–0.5)
LYMPHOCYTES # BLD AUTO: 0.7 K/UL (ref 1–4.8)
LYMPHOCYTES NFR BLD: 12.1 % (ref 18–48)
MAGNESIUM SERPL-MCNC: 2 MG/DL (ref 1.6–2.6)
MCH RBC QN AUTO: 31.9 PG (ref 27–31)
MCHC RBC AUTO-ENTMCNC: 33.3 G/DL (ref 32–36)
MCV RBC AUTO: 96 FL (ref 82–98)
MONOCYTES # BLD AUTO: 1.1 K/UL (ref 0.3–1)
MONOCYTES NFR BLD: 17.6 % (ref 4–15)
NEUTROPHILS # BLD AUTO: 4.1 K/UL (ref 1.8–7.7)
NEUTROPHILS NFR BLD: 67.1 % (ref 38–73)
NRBC BLD-RTO: 0 /100 WBC
PHOSPHATE SERPL-MCNC: 2.5 MG/DL (ref 2.7–4.5)
PLATELET # BLD AUTO: 195 K/UL (ref 150–350)
PLATELET BLD QL SMEAR: ABNORMAL
PMV BLD AUTO: 9.5 FL (ref 9.2–12.9)
POCT GLUCOSE: 118 MG/DL (ref 70–110)
POCT GLUCOSE: 127 MG/DL (ref 70–110)
POCT GLUCOSE: 142 MG/DL (ref 70–110)
POCT GLUCOSE: 192 MG/DL (ref 70–110)
POTASSIUM SERPL-SCNC: 3.7 MMOL/L (ref 3.5–5.1)
PROT SERPL-MCNC: 6.9 G/DL (ref 6–8.4)
RBC # BLD AUTO: 4.76 M/UL (ref 4.6–6.2)
SODIUM SERPL-SCNC: 140 MMOL/L (ref 136–145)
WBC # BLD AUTO: 6.13 K/UL (ref 3.9–12.7)

## 2020-01-22 PROCEDURE — 83735 ASSAY OF MAGNESIUM: CPT

## 2020-01-22 PROCEDURE — 11000001 HC ACUTE MED/SURG PRIVATE ROOM

## 2020-01-22 PROCEDURE — 84100 ASSAY OF PHOSPHORUS: CPT

## 2020-01-22 PROCEDURE — 63600175 PHARM REV CODE 636 W HCPCS: Performed by: HOSPITALIST

## 2020-01-22 PROCEDURE — C9113 INJ PANTOPRAZOLE SODIUM, VIA: HCPCS | Performed by: HOSPITALIST

## 2020-01-22 PROCEDURE — 85025 COMPLETE CBC W/AUTO DIFF WBC: CPT

## 2020-01-22 PROCEDURE — 80053 COMPREHEN METABOLIC PANEL: CPT

## 2020-01-22 PROCEDURE — 36415 COLL VENOUS BLD VENIPUNCTURE: CPT

## 2020-01-22 RX ORDER — AMLODIPINE BESYLATE 5 MG/1
5 TABLET ORAL DAILY
Status: CANCELLED | OUTPATIENT
Start: 2020-01-22

## 2020-01-22 RX ORDER — BENAZEPRIL HYDROCHLORIDE 10 MG/1
10 TABLET ORAL DAILY
Status: CANCELLED | OUTPATIENT
Start: 2020-01-22

## 2020-01-22 RX ADMIN — INSULIN ASPART 1 UNITS: 100 INJECTION, SOLUTION INTRAVENOUS; SUBCUTANEOUS at 08:01

## 2020-01-22 RX ADMIN — SODIUM CHLORIDE, SODIUM LACTATE, POTASSIUM CHLORIDE, AND CALCIUM CHLORIDE: .6; .31; .03; .02 INJECTION, SOLUTION INTRAVENOUS at 04:01

## 2020-01-22 RX ADMIN — PANTOPRAZOLE SODIUM 40 MG: 40 INJECTION, POWDER, FOR SOLUTION INTRAVENOUS at 08:01

## 2020-01-22 RX ADMIN — INSULIN DETEMIR 10 UNITS: 100 INJECTION, SOLUTION SUBCUTANEOUS at 08:01

## 2020-01-22 NOTE — PROGRESS NOTES
"[facility] Hospital Medicine  Progress Note    Patient Name: Berto Hawthorne  MRN: [id number]  Patient Class: IP- Inpatient   Admission Date: 1/20/2020  Length of Stay: 1 days  Attending Physician: Svetlana Cunningham, *  Primary Care Provider: Provider Notinsystem        Subjective:     Principal Problem:Partial small bowel obstruction        HPI:  64-year-old male with HTN, HLP, DM2 and with history of partial SBO x4 in the past treated conservatively who presented with abdominal pain and distension that started last night around 8:00 p.m. He has been having constipation for the past week. His last normal bowel movement was 1 week ago, but he reported passing some flatus and small, hard stool this morning.  Pain did not improve with this small passing of stool, and it only increased.  Abdominal pain is diffuse but mainly located in the mid abdomen.  No aggravating or relieving factors.  No reported nausea or vomiting.  No shortness of breath or chest pain, no fever or chills.  In the ER, workup with CT scan of the abdomen pelvis without contrast showed "The distal esophagus is somewhat patulous with layering fluid.  Gastric lumen is fluid distended with high density material at the pylorus.  The proximal and mid small bowel including the duodenum and jejunum are dilated and fluid-filled common definitive transition point is not identified, however the ileum appears collapsed.  Stool is seen throughout the colon."  WBC elevated 19.56, afebrile temperature of 97.6° F.    Overview/Hospital Course:  No notes on file    Interval History: Resting in bed with NGT in place  Discussed plan of care and patient in agreement    Review of Systems   Constitutional: Negative for chills and fever.   HENT: Negative for sore throat and trouble swallowing.    Eyes: Negative for visual disturbance.   Respiratory: Negative for shortness of breath.    Cardiovascular: Negative for chest pain.   Gastrointestinal: " Positive for abdominal distention and abdominal pain. Negative for blood in stool, diarrhea, nausea and vomiting.   Endocrine: Negative for polyuria.   Genitourinary: Negative for dysuria.   Musculoskeletal: Negative for back pain.   Skin: Negative for rash.   Neurological: Negative for light-headedness.   Hematological: Does not bruise/bleed easily.   Psychiatric/Behavioral: Negative for confusion.     Objective:     Vital Signs (Most Recent):  Temp: 98 °F (36.7 °C) (01/21/20 2013)  Pulse: 96 (01/21/20 2013)  Resp: 19 (01/21/20 2013)  BP: (!) 142/74 (01/21/20 2013)  SpO2: (!) 94 % (01/21/20 2013) Vital Signs (24h Range):  Temp:  [97.9 °F (36.6 °C)-98.3 °F (36.8 °C)] 98 °F (36.7 °C)  Pulse:  [] 96  Resp:  [16-19] 19  SpO2:  [94 %-95 %] 94 %  BP: (136-171)/(70-90) 142/74     Weight: 106.6 kg (235 lb)  Body mass index is 34.7 kg/m².    Intake/Output Summary (Last 24 hours) at 1/21/2020 2109  Last data filed at 1/21/2020 1814  Gross per 24 hour   Intake 0 ml   Output 2800 ml   Net -2800 ml      Physical Exam   Constitutional: He appears well-developed. No distress.   HENT:   Head: Normocephalic and atraumatic.   NG tube in place with small amount of blood around nares   Eyes: Conjunctivae and EOM are normal.   Neck: Normal range of motion. Neck supple.   Cardiovascular: Normal rate and regular rhythm.   Pulmonary/Chest: Effort normal and breath sounds normal.   Abdominal:   Distended, tenderness to palpation diffusely, but concentrated around the umbilicus, no rebound or guarding, hypoactive bowel sounds   Musculoskeletal: Normal range of motion. He exhibits no edema.   Neurological: He is alert.   Skin: Skin is warm and dry. Capillary refill takes less than 2 seconds. He is not diaphoretic.   Psychiatric: He has a normal mood and affect. His behavior is normal. Thought content normal.       Significant Labs:   CBC:   Recent Labs   Lab 01/20/20  1443 01/21/20  0401   WBC 19.56* 13.49*   HGB 16.0 17.0   HCT 46.5  51.0    303     CMP:   Recent Labs   Lab 01/20/20  1443 01/21/20  0401    140   K 4.1 4.1    102   CO2 26 25   * 212*   BUN 19 22   CREATININE 1.2 0.9   CALCIUM 9.6 9.7   PROT 7.9 7.9   ALBUMIN 4.4 4.3   BILITOT 0.4 0.6   ALKPHOS 88 99   AST 26 20   ALT 27 30   ANIONGAP 11 13   EGFRNONAA >60 >60     All pertinent labs within the past 24 hours have been reviewed.    Significant Imaging: I have reviewed all pertinent imaging results/findings within the past 24 hours.      Assessment/Plan:      * Partial small bowel obstruction  Patient with 4 episodes of partial SBO in that past, last hospitalization about 2 years ago treated in [place]  CT scan with partial SBO vs ileus  S/p NG for decompression placed in the ER  Will treat with bowel rest, IV fluids, supportive care and pain control  General Surgery consulted  Plan for CT of abd for further eval, etiology of mult SBOs unclear  Await results and further recommendations    Leukocytosis  Likely leukomoid reaction secondary to above  No source of infection and no fevers  No indication for antibiotics at this time  WBC down to 13.49  Will continue to monitor    Hypertension  Unable to resume PO regimen due to NPO status  Will use PRN labetolol   Will resume PO regimen when able    Hyperlipidemia  Usually on atorvastatin  Hold statin for now given NPO status    Diabetes mellitus, type 2  Hold outpatient regimen and unclear control in the past  Will continue low dose basal insulin, accuchecks Q6 hours while NPO and have SSI   HgbA1C 5.7  Glucose goal of 140-180 during hospitalization      VTE Risk Mitigation (From admission, onward)         Ordered     Place ERVIN hose  Until discontinued      01/20/20 1804     IP VTE HIGH RISK PATIENT  Once      01/20/20 1740     Place sequential compression device  Until discontinued      01/20/20 1740                      Svetlana Cunningham MD  Department of Hospital Medicine   [facility]

## 2020-01-22 NOTE — ASSESSMENT & PLAN NOTE
Likely leukomoid reaction secondary to above  No source of infection and no fevers  No indication for antibiotics at this time  WBC down to 13.49  Will continue to monitor

## 2020-01-22 NOTE — ASSESSMENT & PLAN NOTE
Hold outpatient regimen and unclear control in the past  Will continue low dose basal insulin, accuchecks Q6 hours while NPO and have SSI   HgbA1C 5.7  Glucose goal of 140-180 during hospitalization

## 2020-01-22 NOTE — PROGRESS NOTES
Patient reports feeling much better, passing flatus, has apetite.  He denies nausea.      Abdomen is softer, non-tender without rebound/guarding.  NGT with only 150mL output.  Afebrile with WBC 6.      A/P  CT scan from yesterday reviewed showed inflammatory process in the small bowel and mesentery. Suspect IBD.      -Remove NgT, recommend antibiotics  -consult to GI, eval for ibd and followup recommendations  -ok to advance diet as tolerated from our standpoint but wait until cleared by GI    Darryl Whalen MD  [facility] Surgery Resident  C: [phone]

## 2020-01-22 NOTE — PLAN OF CARE
Pt remained free from falls and injury during shift, medication administered per MD orders, IV fluids maintained, no complaint of nausea or abd pain, no out put from NG tube on my shift, NG tube pulled, pt tolerated clears well so we advanced diet to reg diet,  pt had a large bowel movement,Tele box 8528,SCD,ERVIN, safety maintained will continue to monitor.

## 2020-01-22 NOTE — ASSESSMENT & PLAN NOTE
Patient with 4 episodes of partial SBO in that past, last hospitalization about 2 years ago treated in [place]  CT scan with partial SBO vs ileus  S/p NG for decompression placed in the ER  Will treat with bowel rest, IV fluids, supportive care and pain control  General Surgery consulted  Plan for CT of abd for further eval, etiology of mult SBOs unclear  Await results and further recommendations

## 2020-01-22 NOTE — CONSULTS
.[facility]  Gastroenterology  Consult Note    Patient Name: Berto Hawthorne  MRN: [id number]  Admission Date: 1/20/2020  Hospital Length of Stay: 2 days  Code Status: Full Code   Primary Care Physician: Provider Notinsystem  Principal Problem:Partial small bowel obstruction    Consults  Subjective:     Chief complaint: Abdominal pain    HPI: Acute, recurrent, persistent, diffuse, non-radiating, moderate severity abd pain with some nausea.  He has had four such episodes in the past requiring admission.  No hx of radiation or abdominal surgery. No FH or personal hx of Crohns.  NGT removed yest.  He is feeling much better today and passing flatus and stool.  He wishes to eat.  No wt loss.      He is from [place] and is returning there as soon as he leaves here.     Past medical history:  Past Medical History:   Diagnosis Date    Diabetes mellitus     Hyperlipidemia     Hypertension     SBO (small bowel obstruction)     Partial obstruction treated conservatively x 4       Past surgical history:  History reviewed. No pertinent surgical history.    Social history:  Social History     Socioeconomic History    Marital status:      Spouse name: Not on file    Number of children: Not on file    Years of education: Not on file    Highest education level: Not on file   Occupational History    Not on file   Social Needs    Financial resource strain: Not on file    Food insecurity:     Worry: Not on file     Inability: Not on file    Transportation needs:     Medical: Not on file     Non-medical: Not on file   Tobacco Use    Smoking status: Former Smoker     Packs/day: 1.50     Years: 15.00     Pack years: 22.50    Smokeless tobacco: Never Used    Tobacco comment: Quit smoking in 1992   Substance and Sexual Activity    Alcohol use: No    Drug use: Not on file    Sexual activity: Not on file   Lifestyle    Physical activity:     Days per week: Not on file     Minutes per session: Not on  file    Stress: Not on file   Relationships    Social connections:     Talks on phone: Not on file     Gets together: Not on file     Attends Rastafarian service: Not on file     Active member of club or organization: Not on file     Attends meetings of clubs or organizations: Not on file     Relationship status: Not on file   Other Topics Concern    Not on file   Social History Narrative    Not on file       Medications:  Medications Prior to Admission   Medication Sig Dispense Refill Last Dose    buPROPion (WELLBUTRIN SR) 150 MG TBSR 12 hr tablet Take 150 mg by mouth.       amlodipine-benazepril 10-20mg (LOTREL) 10-20 mg per capsule Take 1 capsule by mouth once daily.         aspirin 81 MG Chew Take 81 mg by mouth once daily.         atorvastatin (LIPITOR) 40 MG tablet        buPROPion (WELLBUTRIN SR) 150 MG TBSR 12 hr tablet        duloxetine (CYMBALTA) 60 MG capsule Take 60 mg by mouth once daily.         ezetimibe-simvastatin 10-40 mg (VYTORIN) 10-40 mg per tablet Take 1 tablet by mouth every evening.         glipiZIDE (GLUCOTROL) 5 MG tablet        metformin (GLUCOPHAGE) 850 MG tablet Take 850 mg by mouth 2 (two) times daily with meals.         omeprazole (PRILOSEC) 40 MG capsule        TRULICITY 1.5 mg/0.5 mL PnIj           Allergies:  Review of patient's allergies indicates:  No Known Allergies    Review of systems:  CONSTITUTIONAL: Negative for fever, chills, weakness, weight loss, weight gain.  HEENT: Negative for blurred vision, hearing loss, nasal congestion, dry mouth, sore throat.  CARDIOVASCULAR: Negative for chest pain or palpitations.  RESPIRATORY: Negative for SOB or cough.  GASTROINTESTINAL: See HPI  GENITOURINARY: Negative for dysuria or hematuria.  MUSCULOSKELETAL: Negative for osteoarthritis or muscle pain.  SKIN: Negative for rashes/lesions.    Objective:     Vital Signs (Most Recent):  Temp: 98.4 °F (36.9 °C) (01/22/20 0732)  Pulse: 79 (01/22/20 0732)  Resp: (!) 21 (01/22/20  0732)  BP: (!) 152/88 (01/22/20 0743)  SpO2: 95 % (01/22/20 0732) Vital Signs (24h Range):  Temp:  [97.9 °F (36.6 °C)-98.5 °F (36.9 °C)] 98.4 °F (36.9 °C)  Pulse:  [] 79  Resp:  [18-21] 21  SpO2:  [93 %-95 %] 95 %  BP: (136-172)/(70-93) 152/88     Physical examination:  General: well developed, well nourished, no apparent distress  HENT: NCAT, hearing grossly intact, no palpable or visible thyroid mass  Eyes: PERRL, EOMI, anicteric sclera  Cardiovascular: Regular rate and rhythm. No murmurs appreciated.  Lungs: Non-labored respirations. Breath sounds equal.   Abdomen: soft, NTND, normoactive BS  Extremities: No C/C/E, 2+ dorsalis pedis pulses bilaterally  Skin: No jaundice, rashes or lesions  Musculoskeletal: 5/5 strength bilaterally    Labs:  WBC trending down    Imaging:  CT scan reviewed      Assessment:     Bowel obstruction/ileus - improving clinically and radiographically.  Exam benign.  WBC improving.     Plan:   - Advance diet  - Short course abx for 7 days  - Leukocytosis would suggest more of an infectious enteritis with associated ileus/obstruction.  However, he needs to follow up with his GI physician in [place] to assess for possible SB Crohns, given recurrent obstruction with lack of previous abdominal surgery/radiation.  He understands this.      Call back with questions.         Thank you for your consult.     Ang Hill MD  Gastroenterology  [facility]

## 2020-01-22 NOTE — PLAN OF CARE
Problem: Fall Injury Risk  Goal: Absence of Fall and Fall-Related Injury  Outcome: Ongoing, Progressing  Intervention: Identify and Manage Contributors to Fall Injury Risk  Flowsheets (Taken 1/22/2020 0400)  Self-Care Promotion: independence encouraged  Medication Review/Management: medications reviewed  Intervention: Promote Injury-Free Environment  Flowsheets (Taken 1/22/2020 0400)  Safety Promotion/Fall Prevention: assistive device/personal item within reach;nonskid shoes/socks when out of bed;side rails raised x 2;instructed to call staff for mobility  Environmental Safety Modification: assistive device/personal items within reach;lighting adjusted;clutter free environment maintained;room organization consistent     Problem: Adult Inpatient Plan of Care  Goal: Plan of Care Review  Outcome: Ongoing, Progressing  Goal: Optimal Comfort and Wellbeing  Outcome: Ongoing, Progressing  Intervention: Provide Person-Centered Care  Flowsheets (Taken 1/22/2020 0400)  Trust Relationship/Rapport: care explained;questions answered;questions encouraged     Problem: Diabetes Comorbidity  Goal: Blood Glucose Level Within Desired Range  Outcome: Ongoing, Progressing  Intervention: Maintain Glycemic Control  Flowsheets (Taken 1/22/2020 0400)  Glycemic Management: blood glucose monitoring

## 2020-01-22 NOTE — SUBJECTIVE & OBJECTIVE
Interval History: Resting in bed with NGT in place  Discussed plan of care and patient in agreement    Review of Systems   Constitutional: Negative for chills and fever.   HENT: Negative for sore throat and trouble swallowing.    Eyes: Negative for visual disturbance.   Respiratory: Negative for shortness of breath.    Cardiovascular: Negative for chest pain.   Gastrointestinal: Positive for abdominal distention and abdominal pain. Negative for blood in stool, diarrhea, nausea and vomiting.   Endocrine: Negative for polyuria.   Genitourinary: Negative for dysuria.   Musculoskeletal: Negative for back pain.   Skin: Negative for rash.   Neurological: Negative for light-headedness.   Hematological: Does not bruise/bleed easily.   Psychiatric/Behavioral: Negative for confusion.     Objective:     Vital Signs (Most Recent):  Temp: 98 °F (36.7 °C) (01/21/20 2013)  Pulse: 96 (01/21/20 2013)  Resp: 19 (01/21/20 2013)  BP: (!) 142/74 (01/21/20 2013)  SpO2: (!) 94 % (01/21/20 2013) Vital Signs (24h Range):  Temp:  [97.9 °F (36.6 °C)-98.3 °F (36.8 °C)] 98 °F (36.7 °C)  Pulse:  [] 96  Resp:  [16-19] 19  SpO2:  [94 %-95 %] 94 %  BP: (136-171)/(70-90) 142/74     Weight: 106.6 kg (235 lb)  Body mass index is 34.7 kg/m².    Intake/Output Summary (Last 24 hours) at 1/21/2020 2109  Last data filed at 1/21/2020 1814  Gross per 24 hour   Intake 0 ml   Output 2800 ml   Net -2800 ml      Physical Exam   Constitutional: He appears well-developed. No distress.   HENT:   Head: Normocephalic and atraumatic.   NG tube in place with small amount of blood around nares   Eyes: Conjunctivae and EOM are normal.   Neck: Normal range of motion. Neck supple.   Cardiovascular: Normal rate and regular rhythm.   Pulmonary/Chest: Effort normal and breath sounds normal.   Abdominal:   Distended, tenderness to palpation diffusely, but concentrated around the umbilicus, no rebound or guarding, hypoactive bowel sounds   Musculoskeletal: Normal range of  motion. He exhibits no edema.   Neurological: He is alert.   Skin: Skin is warm and dry. Capillary refill takes less than 2 seconds. He is not diaphoretic.   Psychiatric: He has a normal mood and affect. His behavior is normal. Thought content normal.       Significant Labs:   CBC:   Recent Labs   Lab 01/20/20  1443 01/21/20  0401   WBC 19.56* 13.49*   HGB 16.0 17.0   HCT 46.5 51.0    303     CMP:   Recent Labs   Lab 01/20/20  1443 01/21/20  0401    140   K 4.1 4.1    102   CO2 26 25   * 212*   BUN 19 22   CREATININE 1.2 0.9   CALCIUM 9.6 9.7   PROT 7.9 7.9   ALBUMIN 4.4 4.3   BILITOT 0.4 0.6   ALKPHOS 88 99   AST 26 20   ALT 27 30   ANIONGAP 11 13   EGFRNONAA >60 >60     All pertinent labs within the past 24 hours have been reviewed.    Significant Imaging: I have reviewed all pertinent imaging results/findings within the past 24 hours.

## 2020-01-23 VITALS
HEIGHT: 69 IN | TEMPERATURE: 99 F | SYSTOLIC BLOOD PRESSURE: 172 MMHG | OXYGEN SATURATION: 95 % | HEART RATE: 72 BPM | RESPIRATION RATE: 17 BRPM | BODY MASS INDEX: 34.8 KG/M2 | DIASTOLIC BLOOD PRESSURE: 81 MMHG | WEIGHT: 235 LBS

## 2020-01-23 PROBLEM — K52.9 ENTERITIS: Status: ACTIVE | Noted: 2020-01-23

## 2020-01-23 PROBLEM — D72.829 LEUKOCYTOSIS: Status: RESOLVED | Noted: 2020-01-20 | Resolved: 2020-01-23

## 2020-01-23 PROBLEM — K56.600 PARTIAL SMALL BOWEL OBSTRUCTION: Status: RESOLVED | Noted: 2020-01-20 | Resolved: 2020-01-23

## 2020-01-23 LAB
ALBUMIN SERPL BCP-MCNC: 3.5 G/DL (ref 3.5–5.2)
ALP SERPL-CCNC: 72 U/L (ref 55–135)
ALT SERPL W/O P-5'-P-CCNC: 19 U/L (ref 10–44)
ANION GAP SERPL CALC-SCNC: 7 MMOL/L (ref 8–16)
AST SERPL-CCNC: 16 U/L (ref 10–40)
BASOPHILS # BLD AUTO: 0.05 K/UL (ref 0–0.2)
BASOPHILS NFR BLD: 0.8 % (ref 0–1.9)
BILIRUB SERPL-MCNC: 0.4 MG/DL (ref 0.1–1)
BUN SERPL-MCNC: 8 MG/DL (ref 8–23)
CALCIUM SERPL-MCNC: 8.7 MG/DL (ref 8.7–10.5)
CHLORIDE SERPL-SCNC: 104 MMOL/L (ref 95–110)
CO2 SERPL-SCNC: 28 MMOL/L (ref 23–29)
CREAT SERPL-MCNC: 0.7 MG/DL (ref 0.5–1.4)
DIFFERENTIAL METHOD: ABNORMAL
EOSINOPHIL # BLD AUTO: 0.2 K/UL (ref 0–0.5)
EOSINOPHIL NFR BLD: 2.6 % (ref 0–8)
ERYTHROCYTE [DISTWIDTH] IN BLOOD BY AUTOMATED COUNT: 12 % (ref 11.5–14.5)
EST. GFR  (AFRICAN AMERICAN): >60 ML/MIN/1.73 M^2
EST. GFR  (NON AFRICAN AMERICAN): >60 ML/MIN/1.73 M^2
GLUCOSE SERPL-MCNC: 117 MG/DL (ref 70–110)
HCT VFR BLD AUTO: 39.6 % (ref 40–54)
HGB BLD-MCNC: 13.8 G/DL (ref 14–18)
IMM GRANULOCYTES # BLD AUTO: 0.01 K/UL (ref 0–0.04)
IMM GRANULOCYTES NFR BLD AUTO: 0.2 % (ref 0–0.5)
LYMPHOCYTES # BLD AUTO: 1.1 K/UL (ref 1–4.8)
LYMPHOCYTES NFR BLD: 17.9 % (ref 18–48)
MAGNESIUM SERPL-MCNC: 1.9 MG/DL (ref 1.6–2.6)
MCH RBC QN AUTO: 32.4 PG (ref 27–31)
MCHC RBC AUTO-ENTMCNC: 34.8 G/DL (ref 32–36)
MCV RBC AUTO: 93 FL (ref 82–98)
MONOCYTES # BLD AUTO: 0.9 K/UL (ref 0.3–1)
MONOCYTES NFR BLD: 15 % (ref 4–15)
NEUTROPHILS # BLD AUTO: 3.9 K/UL (ref 1.8–7.7)
NEUTROPHILS NFR BLD: 63.5 % (ref 38–73)
NRBC BLD-RTO: 0 /100 WBC
PHOSPHATE SERPL-MCNC: 2.8 MG/DL (ref 2.7–4.5)
PLATELET # BLD AUTO: 210 K/UL (ref 150–350)
PMV BLD AUTO: 9.3 FL (ref 9.2–12.9)
POCT GLUCOSE: 149 MG/DL (ref 70–110)
POTASSIUM SERPL-SCNC: 3.6 MMOL/L (ref 3.5–5.1)
PROT SERPL-MCNC: 6.6 G/DL (ref 6–8.4)
RBC # BLD AUTO: 4.26 M/UL (ref 4.6–6.2)
SODIUM SERPL-SCNC: 139 MMOL/L (ref 136–145)
WBC # BLD AUTO: 6.13 K/UL (ref 3.9–12.7)

## 2020-01-23 PROCEDURE — 36415 COLL VENOUS BLD VENIPUNCTURE: CPT

## 2020-01-23 PROCEDURE — 83735 ASSAY OF MAGNESIUM: CPT

## 2020-01-23 PROCEDURE — 80053 COMPREHEN METABOLIC PANEL: CPT

## 2020-01-23 PROCEDURE — 99239 HOSP IP/OBS DSCHRG MGMT >30: CPT | Mod: ,,, | Performed by: INTERNAL MEDICINE

## 2020-01-23 PROCEDURE — C9113 INJ PANTOPRAZOLE SODIUM, VIA: HCPCS | Performed by: HOSPITALIST

## 2020-01-23 PROCEDURE — 99239 PR HOSPITAL DISCHARGE DAY,>30 MIN: ICD-10-PCS | Mod: ,,, | Performed by: INTERNAL MEDICINE

## 2020-01-23 PROCEDURE — 63600175 PHARM REV CODE 636 W HCPCS: Performed by: HOSPITALIST

## 2020-01-23 PROCEDURE — 84100 ASSAY OF PHOSPHORUS: CPT

## 2020-01-23 PROCEDURE — 85025 COMPLETE CBC W/AUTO DIFF WBC: CPT

## 2020-01-23 RX ORDER — METRONIDAZOLE 500 MG/1
500 TABLET ORAL EVERY 12 HOURS
Qty: 14 TABLET | Refills: 0 | Status: SHIPPED | OUTPATIENT
Start: 2020-01-23 | End: 2020-01-30

## 2020-01-23 RX ORDER — CIPROFLOXACIN 500 MG/1
500 TABLET ORAL EVERY 12 HOURS
Qty: 14 TABLET | Refills: 0 | Status: SHIPPED | OUTPATIENT
Start: 2020-01-23 | End: 2020-01-30

## 2020-01-23 RX ADMIN — PANTOPRAZOLE SODIUM 40 MG: 40 INJECTION, POWDER, FOR SOLUTION INTRAVENOUS at 09:01

## 2020-01-23 NOTE — ASSESSMENT & PLAN NOTE
Patient with 4 episodes of partial SBO in that past, last hospitalization about 2 years ago treated in [place]  CT scan with partial SBO vs ileus  S/p NG for decompression placed in the ER  Will treat with bowel rest, IV fluids, supportive care and pain control  General Surgery consulted  CT of abd c/w enteritis, GI believes this is infectious in etiology  F/u with GI as outpatient, Dr. Power in [place], [place]

## 2020-01-23 NOTE — SUBJECTIVE & OBJECTIVE
Interval History: Resting in bed comfortably  NGT removed, Bowel movement this morning  Discussed plan of care, hopeful for discharge on tomorrow    Review of Systems   Constitutional: Negative for chills and fever.   HENT: Negative for sore throat and trouble swallowing.    Eyes: Negative for visual disturbance.   Respiratory: Negative for shortness of breath.    Cardiovascular: Negative for chest pain.   Gastrointestinal: Positive for abdominal pain. Negative for abdominal distention, blood in stool, diarrhea, nausea and vomiting.   Endocrine: Negative for polyuria.   Genitourinary: Negative for dysuria.   Musculoskeletal: Negative for back pain.   Skin: Negative for rash.   Neurological: Negative for light-headedness.   Hematological: Does not bruise/bleed easily.   Psychiatric/Behavioral: Negative for confusion.     Objective:     Vital Signs (Most Recent):  Temp: 99 °F (37.2 °C) (01/22/20 1553)  Pulse: 72 (01/22/20 1553)  Resp: 18 (01/22/20 1553)  BP: (!) 151/82 (01/22/20 1553)  SpO2: (!) 93 % (01/22/20 1553) Vital Signs (24h Range):  Temp:  [98 °F (36.7 °C)-99 °F (37.2 °C)] 99 °F (37.2 °C)  Pulse:  [72-96] 72  Resp:  [18-21] 18  SpO2:  [93 %-95 %] 93 %  BP: (142-172)/(74-93) 151/82     Weight: 106.6 kg (235 lb)  Body mass index is 34.7 kg/m².    Intake/Output Summary (Last 24 hours) at 1/22/2020 1918  Last data filed at 1/22/2020 1700  Gross per 24 hour   Intake 600 ml   Output 1375 ml   Net -775 ml      Physical Exam   Constitutional: He appears well-developed. No distress.   HENT:   Head: Normocephalic and atraumatic.   Eyes: Conjunctivae and EOM are normal.   Neck: Normal range of motion. Neck supple.   Cardiovascular: Normal rate and regular rhythm.   Pulmonary/Chest: Effort normal and breath sounds normal.   Abdominal:   Distended, tenderness to palpation diffusely, but concentrated around the umbilicus, no rebound or guarding, hypoactive bowel sounds, IMPROVED   Musculoskeletal: Normal range of motion.  He exhibits no edema.   Neurological: He is alert.   Skin: Skin is warm and dry. Capillary refill takes less than 2 seconds. He is not diaphoretic.   Psychiatric: He has a normal mood and affect. His behavior is normal. Thought content normal.       Significant Labs:   CBC:   Recent Labs   Lab 01/21/20  0401 01/22/20  0437   WBC 13.49* 6.13   HGB 17.0 15.2   HCT 51.0 45.6    195     CMP:   Recent Labs   Lab 01/21/20  0401 01/22/20  0437    140   K 4.1 3.7    104   CO2 25 27   * 138*   BUN 22 15   CREATININE 0.9 0.7   CALCIUM 9.7 8.8   PROT 7.9 6.9   ALBUMIN 4.3 3.7   BILITOT 0.6 0.5   ALKPHOS 99 78   AST 20 16   ALT 30 24   ANIONGAP 13 9   EGFRNONAA >60 >60     All pertinent labs within the past 24 hours have been reviewed.    Significant Imaging: I have reviewed all pertinent imaging results/findings within the past 24 hours.

## 2020-01-23 NOTE — PLAN OF CARE
Problem: Fall Injury Risk  Goal: Absence of Fall and Fall-Related Injury  Outcome: Ongoing, Progressing  Intervention: Identify and Manage Contributors to Fall Injury Risk  Flowsheets (Taken 1/23/2020 0446)  Self-Care Promotion: independence encouraged  Medication Review/Management: medications reviewed  Intervention: Promote Injury-Free Environment  Flowsheets (Taken 1/23/2020 0446)  Safety Promotion/Fall Prevention: assistive device/personal item within reach; commode/urinal/bedpan at bedside; Fall Risk signage in place; medications reviewed; side rails raised x 2  Environmental Safety Modification: assistive device/personal items within reach; lighting adjusted; clutter free environment maintained; room organization consistent     Problem: Adult Inpatient Plan of Care  Goal: Plan of Care Review  Outcome: Ongoing, Progressing  Goal: Optimal Comfort and Wellbeing  Outcome: Ongoing, Progressing  Intervention: Provide Person-Centered Care  Flowsheets (Taken 1/23/2020 0446)  Trust Relationship/Rapport: care explained; questions answered; questions encouraged; thoughts/feelings acknowledged     Problem: Diabetes Comorbidity  Goal: Blood Glucose Level Within Desired Range  Outcome: Ongoing, Progressing  Intervention: Maintain Glycemic Control  Flowsheets (Taken 1/23/2020 0446)  Glycemic Management: blood glucose monitoring; oral hydration promoted  AAOX4, no c/o pain. Resting most of the shift. Voiding via urinal. Blood glucose monitored, SSI administered along with scheduled insulin. Tolerating regular diet. No c/o pain, nausea or vomiting. Safety measures in place. Call light in reach. Will continue to monitor.

## 2020-01-23 NOTE — NURSING
Patient left with all discharge papers, verbalizes importance of completing tomorrow's appointment with his doctor

## 2020-01-23 NOTE — PROGRESS NOTES
"[facility] Hospital Medicine  Progress Note    Patient Name: Berto Hawthorne  MRN: [id number]  Patient Class: IP- Inpatient   Admission Date: 1/20/2020  Length of Stay: 2 days  Attending Physician: Svetlana Cunningham, *  Primary Care Provider: Provider Notinsystem        Subjective:     Principal Problem:Partial small bowel obstruction        HPI:  64-year-old male with HTN, HLP, DM2 and with history of partial SBO x4 in the past treated conservatively who presented with abdominal pain and distension that started last night around 8:00 p.m. He has been having constipation for the past week. His last normal bowel movement was 1 week ago, but he reported passing some flatus and small, hard stool this morning.  Pain did not improve with this small passing of stool, and it only increased.  Abdominal pain is diffuse but mainly located in the mid abdomen.  No aggravating or relieving factors.  No reported nausea or vomiting.  No shortness of breath or chest pain, no fever or chills.  In the ER, workup with CT scan of the abdomen pelvis without contrast showed "The distal esophagus is somewhat patulous with layering fluid.  Gastric lumen is fluid distended with high density material at the pylorus.  The proximal and mid small bowel including the duodenum and jejunum are dilated and fluid-filled common definitive transition point is not identified, however the ileum appears collapsed.  Stool is seen throughout the colon."  WBC elevated 19.56, afebrile temperature of 97.6° F.    Overview/Hospital Course:  No notes on file    Interval History: Resting in bed comfortably  NGT removed, Bowel movement this morning  Discussed plan of care, hopeful for discharge on tomorrow    Review of Systems   Constitutional: Negative for chills and fever.   HENT: Negative for sore throat and trouble swallowing.    Eyes: Negative for visual disturbance.   Respiratory: Negative for shortness of breath.    Cardiovascular: " Negative for chest pain.   Gastrointestinal: Positive for abdominal pain. Negative for abdominal distention, blood in stool, diarrhea, nausea and vomiting.   Endocrine: Negative for polyuria.   Genitourinary: Negative for dysuria.   Musculoskeletal: Negative for back pain.   Skin: Negative for rash.   Neurological: Negative for light-headedness.   Hematological: Does not bruise/bleed easily.   Psychiatric/Behavioral: Negative for confusion.     Objective:     Vital Signs (Most Recent):  Temp: 99 °F (37.2 °C) (01/22/20 1553)  Pulse: 72 (01/22/20 1553)  Resp: 18 (01/22/20 1553)  BP: (!) 151/82 (01/22/20 1553)  SpO2: (!) 93 % (01/22/20 1553) Vital Signs (24h Range):  Temp:  [98 °F (36.7 °C)-99 °F (37.2 °C)] 99 °F (37.2 °C)  Pulse:  [72-96] 72  Resp:  [18-21] 18  SpO2:  [93 %-95 %] 93 %  BP: (142-172)/(74-93) 151/82     Weight: 106.6 kg (235 lb)  Body mass index is 34.7 kg/m².    Intake/Output Summary (Last 24 hours) at 1/22/2020 1918  Last data filed at 1/22/2020 1700  Gross per 24 hour   Intake 600 ml   Output 1375 ml   Net -775 ml      Physical Exam   Constitutional: He appears well-developed. No distress.   HENT:   Head: Normocephalic and atraumatic.   Eyes: Conjunctivae and EOM are normal.   Neck: Normal range of motion. Neck supple.   Cardiovascular: Normal rate and regular rhythm.   Pulmonary/Chest: Effort normal and breath sounds normal.   Abdominal:   Distended, tenderness to palpation diffusely, but concentrated around the umbilicus, no rebound or guarding, hypoactive bowel sounds, IMPROVED   Musculoskeletal: Normal range of motion. He exhibits no edema.   Neurological: He is alert.   Skin: Skin is warm and dry. Capillary refill takes less than 2 seconds. He is not diaphoretic.   Psychiatric: He has a normal mood and affect. His behavior is normal. Thought content normal.       Significant Labs:   CBC:   Recent Labs   Lab 01/21/20  0401 01/22/20  0437   WBC 13.49* 6.13   HGB 17.0 15.2   HCT 51.0 45.6     195     CMP:   Recent Labs   Lab 01/21/20  0401 01/22/20  0437    140   K 4.1 3.7    104   CO2 25 27   * 138*   BUN 22 15   CREATININE 0.9 0.7   CALCIUM 9.7 8.8   PROT 7.9 6.9   ALBUMIN 4.3 3.7   BILITOT 0.6 0.5   ALKPHOS 99 78   AST 20 16   ALT 30 24   ANIONGAP 13 9   EGFRNONAA >60 >60     All pertinent labs within the past 24 hours have been reviewed.    Significant Imaging: I have reviewed all pertinent imaging results/findings within the past 24 hours.      Assessment/Plan:      * Partial small bowel obstruction  Patient with 4 episodes of partial SBO in that past, last hospitalization about 2 years ago treated in [place]  CT scan with partial SBO vs ileus  S/p NG for decompression placed in the ER  Will treat with bowel rest, IV fluids, supportive care and pain control  General Surgery consulted  CT of abd c/w enteritis, GI believes this is infectious in etiology  F/u with GI as outpatient, Dr. Power in [place], [place]      Leukocytosis  Likely leukomoid reaction secondary to above  Source of infection was most likely enteritis seen on CT  No indication for antibiotics at this time  WBC down to 6.13  Will continue to monitor    Hypertension  Resumed home medications    Hyperlipidemia  Will restart statin    Diabetes mellitus, type 2   Hold outpatient regimen and unclear control in the past  Will continue low dose basal insulin, accuchecks Q6 hours while NPO and have SSI   HgbA1C 5.7  Glucose goal of 140-180 during hospitalization      VTE Risk Mitigation (From admission, onward)         Ordered     Place ERVIN hose  Until discontinued      01/20/20 1804     IP VTE HIGH RISK PATIENT  Once      01/20/20 1740     Place sequential compression device  Until discontinued      01/20/20 1740                      Svetlana Cunningham MD  Department of Hospital Medicine   [facility]

## 2020-01-23 NOTE — ASSESSMENT & PLAN NOTE
Likely leukomoid reaction secondary to above  Source of infection was most likely enteritis seen on CT  No indication for antibiotics at this time  WBC down to 6.13  Will continue to monitor

## 2020-01-23 NOTE — PLAN OF CARE
01/23/20 1121   Final Note   Assessment Type Final Discharge Note   Anticipated Discharge Disposition Home   Hospital Follow Up  Appt(s) scheduled? Yes   Discharge plans and expectations educations in teach back method with documentation complete? Yes   Right Care Referral Info   Post Acute Recommendation No Care   Post-Acute Status   Post-Acute Authorization Other   Other Status No Post-Acute Service Needs   Discharge Delays None known at this time   EDUCATION:  Things You are responsible For To Manage Your Care At Home:  1.    Getting your prescriptions filled   2.    Taking your medications as directed, DO NOT MISS ANY DOSES!  3.    Going to your follow-up doctor appointment. This is important because it  allow the doctor to monitor your progress and determine if  any changes need to made to your treatment plan.  Call [facility] number for new or repeated problems / symptoms   Call 911 for CP and / or SOB     [facility] notified that all CM needs are met

## 2020-01-25 LAB
BACTERIA BLD CULT: NORMAL
BACTERIA BLD CULT: NORMAL

## 2020-02-07 NOTE — HOSPITAL COURSE
NG tube placed in ER.  General surgery consulted.  Patient monitored with serial KUB is.  Patient treated with bowel rest, IV fluids supportive care, and pain control.  CT of abdomen consistent with enteritis.  GI consulted.  Believes this is infectious in etiology.  Patient continues to have bowel movements.  Tolerating regular food at this time with no nausea vomiting.  Patient started on oral antibiotics and will continue for a total 7 days post discharge. Patient currently lives in [place], and only here for work.  Patient will return to [place] and follow up with his GI physician to assess for possible Crohn's disease. Cleared for discharge by General surgery and GI.  Patient in agreement with plan at time discharge. Will fly back to the [place] today and follow up with PCP and GI physician.

## 2020-02-07 NOTE — DISCHARGE SUMMARY
"[facility] Hospital Medicine  Discharge Summary      Patient Name: Berto Hawthorne  MRN: [id number]  Admission Date: 1/20/2020  Hospital Length of Stay: 3 days  Discharge Date and Time: 1/23/2020 11:31 AM  Attending Physician: No att. providers found   Discharging Provider: Svetlana Cunningham MD  Primary Care Provider: Leslie Notinsystem      HPI:   64-year-old male with HTN, HLP, DM2 and with history of partial SBO x4 in the past treated conservatively who presented with abdominal pain and distension that started last night around 8:00 p.m. He has been having constipation for the past week. His last normal bowel movement was 1 week ago, but he reported passing some flatus and small, hard stool this morning.  Pain did not improve with this small passing of stool, and it only increased.  Abdominal pain is diffuse but mainly located in the mid abdomen.  No aggravating or relieving factors.  No reported nausea or vomiting.  No shortness of breath or chest pain, no fever or chills.  In the ER, workup with CT scan of the abdomen pelvis without contrast showed "The distal esophagus is somewhat patulous with layering fluid.  Gastric lumen is fluid distended with high density material at the pylorus.  The proximal and mid small bowel including the duodenum and jejunum are dilated and fluid-filled common definitive transition point is not identified, however the ileum appears collapsed.  Stool is seen throughout the colon."  WBC elevated 19.56, afebrile temperature of 97.6° F.    * No surgery found *      Hospital Course:   NG tube placed in ER.  General surgery consulted.  Patient monitored with serial KUB is.  Patient treated with bowel rest, IV fluids supportive care, and pain control.  CT of abdomen consistent with enteritis.  GI consulted.  Believes this is infectious in etiology.  Patient continues to have bowel movements.  Tolerating regular food at this time with no nausea vomiting.  Patient " started on oral antibiotics and will continue for a total 7 days post discharge. Patient currently lives in [place], and only here for work.  Patient will return to [place] and follow up with his GI physician to assess for possible Crohn's disease. Cleared for discharge by General surgery and GI.  Patient in agreement with plan at time discharge. Will fly back to the [place] today and follow up with PCP and GI physician.     Consults:   Consults (From admission, onward)        Status Ordering Provider     Inpatient consult to Gastroenterology  Once     Provider:  Ang Hill MD    Completed DARCY VEGA     Inpatient consult to General surgery  Once     Provider:  Abdulaziz Baker MD    Completed WINSTON CHRISTIANSON.          No new Assessment & Plan notes have been filed under this hospital service since the last note was generated.  Service: Hospital Medicine    Final Active Diagnoses:    Diagnosis Date Noted POA    Enteritis [K52.9] 01/23/2020 Yes    Diabetes mellitus, type 2 01/20/2020 Yes    Hyperlipidemia [E78.5] 01/20/2020 Yes    Hypertension [I10] 01/20/2020 Yes      Problems Resolved During this Admission:    Diagnosis Date Noted Date Resolved POA    PRINCIPAL PROBLEM:  Partial small bowel obstruction [K56.600] 01/20/2020 01/23/2020 Yes    Leukocytosis [D72.829] 01/20/2020 01/23/2020 Yes       Discharged Condition: stable    Disposition: Home or Self Care    Follow Up:  Follow-up Information     FRANCISCO JOY On 1/24/2020.    Why:  For follow up after hospital discharge  Contact information:  [address] [facility] [place] [address] [phone]                 Patient Instructions:      Diet diabetic     Activity as tolerated       Significant Diagnostic Studies: Labs: All labs within the past 24 hours have been reviewed    Pending Diagnostic Studies:     None         Medications:  Reconciled Home Medications:      Medication List      CHANGE how you take these medications    buPROPion 150 MG  TBSR 12 hr tablet  Commonly known as:  WELLBUTRIN SR  Take 150 mg by mouth.  What changed:  Another medication with the same name was removed. Continue taking this medication, and follow the directions you see here.        CONTINUE taking these medications    amlodipine-benazepril 10-20mg 10-20 mg per capsule  Commonly known as:  LOTREL  Take 1 capsule by mouth once daily.     aspirin 81 MG Chew  Take 81 mg by mouth once daily.     atorvastatin 40 MG tablet  Commonly known as:  LIPITOR     DULoxetine 60 MG capsule  Commonly known as:  CYMBALTA  Take 60 mg by mouth once daily.     ezetimibe-simvastatin 10-40 mg 10-40 mg per tablet  Commonly known as:  VYTORIN  Take 1 tablet by mouth every evening.     glipiZIDE 5 MG tablet  Commonly known as:  GLUCOTROL     metFORMIN 850 MG tablet  Commonly known as:  GLUCOPHAGE  Take 850 mg by mouth 2 (two) times daily with meals.     omeprazole 40 MG capsule  Commonly known as:  PRILOSEC     Trulicity 1.5 mg/0.5 mL Pnij  Generic drug:  dulaglutide        ASK your doctor about these medications    ciprofloxacin HCl 500 MG tablet  Commonly known as:  CIPRO  Take 1 tablet (500 mg total) by mouth every 12 (twelve) hours. for 7 days  Ask about: Should I take this medication?     metroNIDAZOLE 500 MG tablet  Commonly known as:  FLAGYL  Take 1 tablet (500 mg total) by mouth every 12 (twelve) hours. for 7 days  Ask about: Should I take this medication?            Indwelling Lines/Drains at time of discharge:   Lines/Drains/Airways     None                 Time spent on the discharge of patient: >30 minutes  Patient was seen and examined on the date of discharge and determined to be suitable for discharge.         Svetlana Cunningham MD  Department of Hospital Medicine  [facility]

## 2023-11-06 NOTE — ASSESSMENT & PLAN NOTE
Will restart statin   Trilobed Flap Text: Because of the size and full-thickness nature of the defect, and to maintain form and function of the nose, and to avoid distortion of the nearby nasal tip and ala, a trilobed transposition flap was planned. After prep and local anesthesia, the trilobe was carefully incised with a Burow's triangle oriented superiorly. The flap was raised and the wound edges were undermined in the submuscular plane to the nasofacial junction. After hemostasis, the flaps were carried over into the defect and sutured in a layered fashion